# Patient Record
Sex: FEMALE | URBAN - METROPOLITAN AREA
[De-identification: names, ages, dates, MRNs, and addresses within clinical notes are randomized per-mention and may not be internally consistent; named-entity substitution may affect disease eponyms.]

---

## 2023-10-11 ENCOUNTER — NURSE TRIAGE (OUTPATIENT)
Dept: NURSING | Facility: CLINIC | Age: 68
End: 2023-10-11

## 2023-10-11 NOTE — TELEPHONE ENCOUNTER
Patient's neighbor, Stephen, calling. No consent to communicate.    Wanting to know more about paxlovid on pt's behalf. Recommended that he have her call her doctors office to see if the medication would be a good fit for her.    Stephen verbalized understanding and states he will let her know.    Laurence Angeles, RN on 10/11/2023 at 10:47 AM     Reason for Disposition   Caller is not with the adult (patient) AND patient has probable NON-URGENT symptoms    Protocols used: Information Only Call - No Triage-A-[facility]